# Patient Record
Sex: MALE | Race: WHITE
[De-identification: names, ages, dates, MRNs, and addresses within clinical notes are randomized per-mention and may not be internally consistent; named-entity substitution may affect disease eponyms.]

---

## 2021-07-31 ENCOUNTER — HOSPITAL ENCOUNTER (EMERGENCY)
Dept: HOSPITAL 53 - M ED | Age: 26
Discharge: HOME | End: 2021-07-31
Payer: COMMERCIAL

## 2021-07-31 VITALS — BODY MASS INDEX: 32.21 KG/M2 | WEIGHT: 200.42 LBS | HEIGHT: 66 IN

## 2021-07-31 VITALS — DIASTOLIC BLOOD PRESSURE: 61 MMHG | SYSTOLIC BLOOD PRESSURE: 111 MMHG
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
## 2021-07-31 DIAGNOSIS — F12.10: ICD-10-CM

## 2021-07-31 DIAGNOSIS — S91.312A: Primary | ICD-10-CM
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
## 2022-11-14 ENCOUNTER — HOSPITAL ENCOUNTER (OUTPATIENT)
Dept: HOSPITAL 53 - M LAB REF | Age: 27
End: 2022-11-14
Attending: OBSTETRICS & GYNECOLOGY
Payer: COMMERCIAL

## 2022-11-14 DIAGNOSIS — Z31.41: Primary | ICD-10-CM

## 2022-11-14 LAB
COLOR SMN: (no result)
SPECIMEN VOL SMN: 5.7 ML (ref 2–5)
SPERM # SMN: 6.2 M/ML (ref 15–?)
VISC SMN QL: (no result)
WBC # SMN AUTO: (no result) 10*3/UL

## 2023-10-03 ENCOUNTER — HOSPITAL ENCOUNTER (OUTPATIENT)
Dept: HOSPITAL 53 - M LAB REF | Age: 28
End: 2023-10-03
Attending: SPECIALIST
Payer: COMMERCIAL

## 2023-10-03 DIAGNOSIS — N46.9: Primary | ICD-10-CM

## 2023-10-03 LAB
COLOR SMN: (no result)
SPECIMEN VOL SMN: 2.5 ML (ref 2–5)
SPERM # SMN: 29.5 M/ML (ref 15–?)
VISC SMN QL: (no result)
WBC # SMN AUTO: (no result) 10*3/UL

## 2024-08-10 ENCOUNTER — HOSPITAL ENCOUNTER (EMERGENCY)
Dept: HOSPITAL 53 - M ED | Age: 29
Discharge: HOME | End: 2024-08-10
Payer: MEDICAID

## 2024-08-10 VITALS — DIASTOLIC BLOOD PRESSURE: 88 MMHG | SYSTOLIC BLOOD PRESSURE: 127 MMHG | OXYGEN SATURATION: 98 % | TEMPERATURE: 97.8 F

## 2024-08-10 VITALS — BODY MASS INDEX: 30.6 KG/M2 | HEIGHT: 66 IN | WEIGHT: 190.39 LBS

## 2024-08-10 DIAGNOSIS — K64.9: Primary | ICD-10-CM

## 2024-08-10 DIAGNOSIS — F17.200: ICD-10-CM

## 2024-08-10 LAB
ALBUMIN SERPL BCG-MCNC: 3.9 G/DL (ref 3.2–5.2)
ALP SERPL-CCNC: 109 U/L (ref 46–116)
ALT SERPL W P-5'-P-CCNC: 27 U/L (ref 7–40)
APTT BLD: 29.3 SECONDS (ref 24.8–34.2)
AST SERPL-CCNC: 18 U/L (ref ?–34)
BASOPHILS # BLD AUTO: 0.1 10^3/UL (ref 0–0.2)
BASOPHILS NFR BLD AUTO: 1.1 % (ref 0–1)
BILIRUB CONJ SERPL-MCNC: < 0.1 MG/DL (ref ?–0.4)
BILIRUB SERPL-MCNC: 0.3 MG/DL (ref 0.3–1.2)
BUN SERPL-MCNC: 19 MG/DL (ref 9–23)
CALCIUM SERPL-MCNC: 9.6 MG/DL (ref 8.5–10.1)
CHLORIDE SERPL-SCNC: 108 MMOL/L (ref 98–107)
CO2 SERPL-SCNC: 26 MMOL/L (ref 20–31)
CREAT SERPL-MCNC: 0.9 MG/DL (ref 0.7–1.3)
EOSINOPHIL # BLD AUTO: 0.4 10^3/UL (ref 0–0.5)
EOSINOPHIL NFR BLD AUTO: 5 % (ref 0–3)
FIBRINOGEN PPP-MCNC: 306 MG/DL (ref 268–480)
GFR SERPL CREATININE-BSD FRML MDRD: > 60 ML/MIN/{1.73_M2} (ref 60–?)
GLUCOSE SERPL-MCNC: 92 MG/DL (ref 60–100)
HCT VFR BLD AUTO: 43.5 % (ref 42–52)
HGB BLD-MCNC: 15 G/DL (ref 13.5–17.5)
INR PPP: 0.96
LIPASE SERPL-CCNC: 34 U/L (ref 12–53)
LYMPHOCYTES # BLD AUTO: 1.8 10^3/UL (ref 1.5–5)
LYMPHOCYTES NFR BLD AUTO: 22.4 % (ref 24–44)
MCH RBC QN AUTO: 31.9 PG (ref 27–33)
MCHC RBC AUTO-ENTMCNC: 34.5 G/DL (ref 32–36.5)
MCV RBC AUTO: 92.6 FL (ref 80–96)
MONOCYTES # BLD AUTO: 0.6 10^3/UL (ref 0–0.8)
MONOCYTES NFR BLD AUTO: 7.4 % (ref 2–8)
NEUTROPHILS # BLD AUTO: 5.2 10^3/UL (ref 1.5–8.5)
NEUTROPHILS NFR BLD AUTO: 63.5 % (ref 36–66)
PLATELET # BLD AUTO: 291 10^3/UL (ref 150–450)
POTASSIUM SERPL-SCNC: 4 MMOL/L (ref 3.5–5.1)
PROT SERPL-MCNC: 7.2 G/DL (ref 5.7–8.2)
PROTHROMBIN TIME: 12.5 SECONDS (ref 12.5–14.5)
RBC # BLD AUTO: 4.7 10^6/UL (ref 4.3–6.1)
SODIUM SERPL-SCNC: 140 MMOL/L (ref 136–145)
WBC # BLD AUTO: 8.2 10^3/UL (ref 4–10)

## 2024-08-10 PROCEDURE — 86850 RBC ANTIBODY SCREEN: CPT

## 2024-08-10 PROCEDURE — 96374 THER/PROPH/DIAG INJ IV PUSH: CPT

## 2024-08-10 PROCEDURE — 80053 COMPREHEN METABOLIC PANEL: CPT

## 2024-08-10 PROCEDURE — 85025 COMPLETE CBC W/AUTO DIFF WBC: CPT

## 2024-08-10 PROCEDURE — 85610 PROTHROMBIN TIME: CPT

## 2024-08-10 PROCEDURE — 86901 BLOOD TYPING SEROLOGIC RH(D): CPT

## 2024-08-10 PROCEDURE — 74177 CT ABD & PELVIS W/CONTRAST: CPT

## 2024-08-10 PROCEDURE — 82248 BILIRUBIN DIRECT: CPT

## 2024-08-10 PROCEDURE — 83690 ASSAY OF LIPASE: CPT

## 2024-08-10 PROCEDURE — 99284 EMERGENCY DEPT VISIT MOD MDM: CPT

## 2024-08-10 PROCEDURE — 85730 THROMBOPLASTIN TIME PARTIAL: CPT

## 2024-08-10 PROCEDURE — 86900 BLOOD TYPING SEROLOGIC ABO: CPT

## 2024-08-10 PROCEDURE — 85384 FIBRINOGEN ACTIVITY: CPT

## 2024-08-10 RX ADMIN — ONDANSETRON ONE MG: 2 INJECTION INTRAMUSCULAR; INTRAVENOUS at 16:27
